# Patient Record
Sex: MALE | Race: BLACK OR AFRICAN AMERICAN | NOT HISPANIC OR LATINO | Employment: UNEMPLOYED | URBAN - METROPOLITAN AREA
[De-identification: names, ages, dates, MRNs, and addresses within clinical notes are randomized per-mention and may not be internally consistent; named-entity substitution may affect disease eponyms.]

---

## 2024-05-22 ENCOUNTER — OFFICE VISIT (OUTPATIENT)
Dept: FAMILY MEDICINE | Facility: CLINIC | Age: 39
End: 2024-05-22
Payer: COMMERCIAL

## 2024-05-22 VITALS
DIASTOLIC BLOOD PRESSURE: 95 MMHG | BODY MASS INDEX: 36.45 KG/M2 | SYSTOLIC BLOOD PRESSURE: 138 MMHG | OXYGEN SATURATION: 97 % | RESPIRATION RATE: 22 BRPM | HEIGHT: 78 IN | TEMPERATURE: 97 F | WEIGHT: 315 LBS | HEART RATE: 95 BPM

## 2024-05-22 DIAGNOSIS — Z13.220 SCREENING CHOLESTEROL LEVEL: ICD-10-CM

## 2024-05-22 DIAGNOSIS — J30.2 SEASONAL ALLERGIC RHINITIS, UNSPECIFIED TRIGGER: ICD-10-CM

## 2024-05-22 DIAGNOSIS — Z76.89 ENCOUNTER TO ESTABLISH CARE: Primary | ICD-10-CM

## 2024-05-22 DIAGNOSIS — R03.0 ELEVATED BP WITHOUT DIAGNOSIS OF HYPERTENSION: ICD-10-CM

## 2024-05-22 DIAGNOSIS — K43.9 ABDOMINAL WALL HERNIA: ICD-10-CM

## 2024-05-22 DIAGNOSIS — Z11.59 NEED FOR HEPATITIS C SCREENING TEST: ICD-10-CM

## 2024-05-22 DIAGNOSIS — Z11.4 SCREENING FOR HIV (HUMAN IMMUNODEFICIENCY VIRUS): ICD-10-CM

## 2024-05-22 PROCEDURE — 36415 COLL VENOUS BLD VENIPUNCTURE: CPT

## 2024-05-22 PROCEDURE — 86803 HEPATITIS C AB TEST: CPT

## 2024-05-22 PROCEDURE — 99203 OFFICE O/P NEW LOW 30 MIN: CPT | Mod: GC

## 2024-05-22 PROCEDURE — 87389 HIV-1 AG W/HIV-1&-2 AB AG IA: CPT

## 2024-05-22 PROCEDURE — 80048 BASIC METABOLIC PNL TOTAL CA: CPT

## 2024-05-22 PROCEDURE — 80061 LIPID PANEL: CPT

## 2024-05-22 RX ORDER — FLUTICASONE PROPIONATE 50 MCG
1 SPRAY, SUSPENSION (ML) NASAL DAILY
Qty: 9.9 ML | Refills: 0 | Status: SHIPPED | OUTPATIENT
Start: 2024-05-22 | End: 2024-08-05

## 2024-05-22 RX ORDER — LORATADINE 10 MG/1
10 TABLET ORAL DAILY
Qty: 90 TABLET | Refills: 0 | Status: SHIPPED | OUTPATIENT
Start: 2024-05-22 | End: 2024-08-20
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
## 2024-05-22 NOTE — LETTER
May 28, 2024      Venu TYSON Dacosta  [address]  [place] [address]        Dear ,    We are writing to inform you of your test results.    Recent labs show negative HIV and Hepatitis C screening. Also your electrolytes and kidney function are within normal limits.     Cholesterol levels show below goal HDL (good cholesterol) we usually like this to be above 40 and your level is 32. One way you can help bring this up is by increasing your physical activity.     Please let us know if you have any questions or concerns.       Resulted Orders   HIV Antigen Antibody Combo Cascade   Result Value Ref Range    HIV Antigen Antibody Combo Nonreactive Nonreactive      Comment:      Negative HIV-1 p24 antigen and HIV-1/2 antibody screening test results usually indicate the absence of HIV-1 and HIV-2 infection. However, such negative results do not rule-out acute HIV infection.  If acute HIV-1 or HIV-2 infection is suspected, detection of HIV-1 or HIV-2 RNA  is recommended.    Hepatitis C Screen Reflex to HCV RNA Quant and Genotype   Result Value Ref Range    Hepatitis C Antibody Nonreactive Nonreactive      Comment:      A nonreactive screening test result does not exclude the possibility of exposure to or infection with HCV. Nonreactive screening test results in individuals with prior exposure to HCV may be due to antibody levels below the limit of detection of this assay or lack of reactivity to the HCV antigens used in this assay. Patients with recent HCV infections (<3 months from time of exposure) may have false-negative HCV antibody results due to the time needed for seroconversion (average of 8 to 9 weeks).   Lipid panel   Result Value Ref Range    Cholesterol 165 <200 mg/dL    Triglycerides 120 <150 mg/dL    Direct Measure HDL 32 (L) >=40 mg/dL    LDL Cholesterol Calculated 109 (H) <=100 mg/dL    Non HDL Cholesterol 133 (H) <130 mg/dL    Patient Fasting > 8hrs? Unknown     Narrative     Cholesterol  Desirable:  <200 mg/dL    Triglycerides  Normal:  Less than 150 mg/dL  Borderline High:  150-199 mg/dL  High:  200-499 mg/dL  Very High:  Greater than or equal to 500 mg/dL    Direct Measure HDL  Female:  Greater than or equal to 50 mg/dL   Male:  Greater than or equal to 40 mg/dL    LDL Cholesterol  Desirable:  <100mg/dL  Above Desirable:  100-129 mg/dL   Borderline High:  130-159 mg/dL   High:  160-189 mg/dL   Very High:  >= 190 mg/dL    Non HDL Cholesterol  Desirable:  130 mg/dL  Above Desirable:  130-159 mg/dL  Borderline High:  160-189 mg/dL  High:  190-219 mg/dL  Very High:  Greater than or equal to 220 mg/dL   Basic metabolic panel   Result Value Ref Range    Sodium 139 135 - 145 mmol/L      Comment:      Reference intervals for this test were updated on 09/26/2023 to more accurately reflect our healthy population. There may be differences in the flagging of prior results with similar values performed with this method. Interpretation of those prior results can be made in the context of the updated reference intervals.     Potassium 3.8 3.4 - 5.3 mmol/L    Chloride 107 98 - 107 mmol/L    Carbon Dioxide (CO2) 22 22 - 29 mmol/L    Anion Gap 10 7 - 15 mmol/L    Urea Nitrogen 17.0 6.0 - 20.0 mg/dL    Creatinine 0.97 0.67 - 1.17 mg/dL    GFR Estimate >90 >60 mL/min/1.73m2    Calcium 9.5 8.6 - 10.0 mg/dL    Glucose 89 70 - 99 mg/dL    Patient Fasting > 8hrs? Unknown        If you have any questions or concerns, please call the clinic at the number listed above.       Sincerely,      Masha Washington MD

## 2024-05-23 LAB
ANION GAP SERPL CALCULATED.3IONS-SCNC: 10 MMOL/L (ref 7–15)
BUN SERPL-MCNC: 17 MG/DL (ref 6–20)
CALCIUM SERPL-MCNC: 9.5 MG/DL (ref 8.6–10)
CHLORIDE SERPL-SCNC: 107 MMOL/L (ref 98–107)
CHOLEST SERPL-MCNC: 165 MG/DL
CREAT SERPL-MCNC: 0.97 MG/DL (ref 0.67–1.17)
DEPRECATED HCO3 PLAS-SCNC: 22 MMOL/L (ref 22–29)
EGFRCR SERPLBLD CKD-EPI 2021: >90 ML/MIN/1.73M2
FASTING STATUS PATIENT QL REPORTED: ABNORMAL
FASTING STATUS PATIENT QL REPORTED: NORMAL
GLUCOSE SERPL-MCNC: 89 MG/DL (ref 70–99)
HCV AB SERPL QL IA: NONREACTIVE
HDLC SERPL-MCNC: 32 MG/DL
HIV 1+2 AB+HIV1 P24 AG SERPL QL IA: NONREACTIVE
LDLC SERPL CALC-MCNC: 109 MG/DL
NONHDLC SERPL-MCNC: 133 MG/DL
POTASSIUM SERPL-SCNC: 3.8 MMOL/L (ref 3.4–5.3)
SODIUM SERPL-SCNC: 139 MMOL/L (ref 135–145)
TRIGL SERPL-MCNC: 120 MG/DL

## 2024-06-03 ENCOUNTER — DOCUMENTATION ONLY (OUTPATIENT)
Dept: FAMILY MEDICINE | Facility: CLINIC | Age: 39
End: 2024-06-03

## 2024-06-03 NOTE — PROGRESS NOTES
Forms Request:  Today's Date: Nallely 3, 2024   Form Type:  Medical Opinion form  ,   Where is the form from: [facility]   How was form received: Patient walk in  CHRISTINE on file: NO   How is form being returned: Picked Up  Fax Number/Address: N/a   PCP: Giovanna Cortez Team: Yellow Team  Form Given to: Call center

## 2024-06-05 ENCOUNTER — OFFICE VISIT (OUTPATIENT)
Dept: FAMILY MEDICINE | Facility: CLINIC | Age: 39
End: 2024-06-05
Payer: COMMERCIAL

## 2024-06-05 VITALS
TEMPERATURE: 98.1 F | BODY MASS INDEX: 36.45 KG/M2 | SYSTOLIC BLOOD PRESSURE: 148 MMHG | WEIGHT: 315 LBS | HEIGHT: 78 IN | DIASTOLIC BLOOD PRESSURE: 104 MMHG | RESPIRATION RATE: 22 BRPM | HEART RATE: 113 BPM | OXYGEN SATURATION: 99 %

## 2024-06-05 DIAGNOSIS — L21.9 SEBORRHEIC DERMATITIS: ICD-10-CM

## 2024-06-05 DIAGNOSIS — I10 ESSENTIAL HYPERTENSION: Primary | ICD-10-CM

## 2024-06-05 PROCEDURE — 99214 OFFICE O/P EST MOD 30 MIN: CPT | Performed by: FAMILY MEDICINE

## 2024-06-05 PROCEDURE — G2211 COMPLEX E/M VISIT ADD ON: HCPCS | Performed by: FAMILY MEDICINE

## 2024-06-05 RX ORDER — HYDROCHLOROTHIAZIDE 12.5 MG/1
12.5 TABLET ORAL DAILY
Qty: 30 TABLET | Refills: 5 | Status: SHIPPED | OUTPATIENT
Start: 2024-06-05

## 2024-06-05 RX ORDER — KETOCONAZOLE 20 MG/ML
SHAMPOO TOPICAL
Qty: 120 ML | Refills: 5 | Status: SHIPPED | OUTPATIENT
Start: 2024-06-05

## 2024-06-05 NOTE — PATIENT INSTRUCTIONS
Take the new medication (hydrochlorothiazide) once a day for your blood pressure  Try to increase your walking to 30-60 minutes every day  Use the shampoo for your scalp twice a week for the next few weeks. Apply 5 to 10 mL to wet scalp, lather, leave on 3 to 5 minutes, and rinse.  Let's see you again in another month to recheck your blood sugar and some more lab tests
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
## 2024-06-26 ENCOUNTER — OFFICE VISIT (OUTPATIENT)
Dept: SURGERY | Facility: CLINIC | Age: 39
End: 2024-06-26
Payer: COMMERCIAL

## 2024-06-26 ENCOUNTER — HOSPITAL ENCOUNTER (INPATIENT)
Facility: CLINIC | Age: 39
Setting detail: SURGERY ADMIT
End: 2024-06-26
Attending: SURGERY | Admitting: SURGERY
Payer: COMMERCIAL

## 2024-06-26 VITALS — WEIGHT: 315 LBS | BODY MASS INDEX: 36.45 KG/M2 | HEIGHT: 78 IN

## 2024-06-26 DIAGNOSIS — K43.2 INCISIONAL HERNIA, WITHOUT OBSTRUCTION OR GANGRENE: Primary | ICD-10-CM

## 2024-06-26 DIAGNOSIS — K43.9 ABDOMINAL WALL HERNIA: ICD-10-CM

## 2024-06-26 PROCEDURE — 99204 OFFICE O/P NEW MOD 45 MIN: CPT | Performed by: SURGERY

## 2024-06-26 RX ORDER — CEFAZOLIN SODIUM/WATER 3 G/30 ML
3 SYRINGE (ML) INTRAVENOUS
Status: CANCELLED | OUTPATIENT
Start: 2024-09-17

## 2024-06-26 RX ORDER — CEFAZOLIN SODIUM/WATER 3 G/30 ML
3 SYRINGE (ML) INTRAVENOUS SEE ADMIN INSTRUCTIONS
Status: CANCELLED | OUTPATIENT
Start: 2024-09-17
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
## 2024-08-05 DIAGNOSIS — J30.2 SEASONAL ALLERGIC RHINITIS, UNSPECIFIED TRIGGER: ICD-10-CM

## 2024-08-05 RX ORDER — FLUTICASONE PROPIONATE 50 MCG
1 SPRAY, SUSPENSION (ML) NASAL DAILY
Qty: 16 G | Refills: 0 | Status: SHIPPED | OUTPATIENT
Start: 2024-08-05

## 2024-09-05 ENCOUNTER — OFFICE VISIT (OUTPATIENT)
Dept: FAMILY MEDICINE | Facility: CLINIC | Age: 39
End: 2024-09-05
Payer: COMMERCIAL

## 2024-09-05 VITALS
DIASTOLIC BLOOD PRESSURE: 105 MMHG | HEIGHT: 78 IN | BODY MASS INDEX: 36.45 KG/M2 | HEART RATE: 107 BPM | WEIGHT: 315 LBS | TEMPERATURE: 98.6 F | OXYGEN SATURATION: 98 % | SYSTOLIC BLOOD PRESSURE: 161 MMHG

## 2024-09-05 DIAGNOSIS — K43.9 VENTRAL HERNIA WITHOUT OBSTRUCTION OR GANGRENE: ICD-10-CM

## 2024-09-05 DIAGNOSIS — Z01.818 PREOP GENERAL PHYSICAL EXAM: Primary | ICD-10-CM

## 2024-09-05 DIAGNOSIS — L73.9 FOLLICULITIS: ICD-10-CM

## 2024-09-05 DIAGNOSIS — I10 ESSENTIAL HYPERTENSION: ICD-10-CM

## 2024-09-05 PROCEDURE — 99214 OFFICE O/P EST MOD 30 MIN: CPT | Mod: 25

## 2024-09-05 PROCEDURE — 90471 IMMUNIZATION ADMIN: CPT

## 2024-09-05 PROCEDURE — 80048 BASIC METABOLIC PNL TOTAL CA: CPT

## 2024-09-05 PROCEDURE — 36415 COLL VENOUS BLD VENIPUNCTURE: CPT

## 2024-09-05 PROCEDURE — 90656 IIV3 VACC NO PRSV 0.5 ML IM: CPT

## 2024-09-05 RX ORDER — CLINDAMYCIN HCL 300 MG
300 CAPSULE ORAL 4 TIMES DAILY
Qty: 20 CAPSULE | Refills: 0 | Status: SHIPPED | OUTPATIENT
Start: 2024-09-05 | End: 2024-09-10

## 2024-09-05 RX ORDER — LOSARTAN POTASSIUM 25 MG/1
25 TABLET ORAL DAILY
Qty: 90 TABLET | Refills: 1 | Status: SHIPPED | OUTPATIENT
Start: 2024-09-05

## 2024-09-05 NOTE — PROGRESS NOTES
Preoperative Evaluation  [facility] [address]  [place] [address]  Phone: [phone]  Fax: [phone]  Primary Provider: Giovanna Montano MD  Pre-op Performing Provider: Giovanna Montano MD  Sep 5, 2024   {Provider  Link to PREOP SmartSet  REQUIRED to apply standard patient instructions and medication directions to the AVS :332478}  {ROOMER review and update patient entered surgical information if needed :927904}        9/5/2024   Surgical Information   What procedure is being done? herina   Facility or Hospital where procedure/surgery will be performed: [facility]   Who is doing the procedure / surgery? dnt remember   Date of surgery / procedure: [id number]   Time of surgery / procedure: 6am   Where do you plan to recover after surgery? Other - ***        Fax number for surgical facility: {:[phone]}    {Provider Charting Preference for Preop :584305}    Dl Lea is a 39 year old, presenting for the following:  Pre-Op Exam (Pre-op 9/19/24. Concern about high blood pressure. Need Flonase (nasal spray).)  Hypertension. Hasn't taken hydrochlorothiazide for the past week or so. He was concerned that the medication was elevating his BP. Donates plasma twice a week. Monday his blood pressure 130s/80s. The longest period he took it consistently 160-170      History meth use 2006 -2007 last use.     No chest pain, sob, or headaches. Never smoker, marijuana occasional use.         9/5/2024     2:20 PM   Additional Questions   Roomed by Sharda   Accompanied by Self         9/5/2024    Information    services provided? No        HPI related to upcoming procedure: ***        9/5/2024   Pre-Op Questionnaire   Have you ever had a heart attack or stroke? No   Have you ever had surgery on your heart or blood vessels, such as a stent placement, a coronary artery bypass, or surgery on an artery in your head, neck, heart, or legs? No   Do you have chest pain with  activity? No   Do you have a history of heart failure? No   Do you currently have a cold, bronchitis or symptoms of other infection? No   Do you have a cough, shortness of breath, or wheezing? No   Do you or anyone in your family have previous history of blood clots? No   Do you or does anyone in your family have a serious bleeding problem such as prolonged bleeding following surgeries or cuts? No   Have you ever had problems with anemia or been told to take iron pills? No   Have you had any abnormal blood loss such as black, tarry or bloody stools? No   Have you ever had a blood transfusion? No   Are you willing to have a blood transfusion if it is medically needed before, during, or after your surgery? Yes   Have you or any of your relatives ever had problems with anesthesia? No   Do you have sleep apnea, excessive snoring or daytime drowsiness? No   Do you have any artifical heart valves or other implanted medical devices like a pacemaker, defibrillator, or continuous glucose monitor? No   Do you have artificial joints? No   Are you allergic to latex? No        Health Care Directive  Patient does not have a Health Care Directive or Living Will: {ADVANCE_DIRECTIVE_STATUS:611989}    Preoperative Review of   {Mnpmpreport:157729}  {Review MNPMP for all patients per [facility] MNPMP Profile:590947}    {Chronic problem details (Optional) :935885}    There are no problems to display for this patient.     No past medical history on file.  Past Surgical History:   Procedure Laterality Date    FACIAL FRACTURE SURGERY       Current Outpatient Medications   Medication Sig Dispense Refill    fluticasone (FLONASE) 50 MCG/ACT nasal spray Spray 1 spray into both nostrils daily 16 g 0    hydroCHLOROthiazide 12.5 MG tablet Take 1 tablet (12.5 mg) by mouth daily 30 tablet 5    ketoconazole (NIZORAL) 2 % external shampoo Apply 5 to 10 mL to wet scalp, lather, leave on 3 to 5 minutes, and rinse; apply twice weekly for 2 to 4 weeks 120  "mL 5       Allergies   Allergen Reactions    No Known Allergies         Social History     Tobacco Use    Smoking status: Never     Passive exposure: Never    Smokeless tobacco: Never   Substance Use Topics    Alcohol use: Not on file     No family history on file.  History   Drug Use Not on file           {ROS Picklists (Optional):179169}    Objective    BP (!) 161/105   Pulse 107   Temp 98.6  F (37  C) (Oral)   Ht 1.981 m (6' 6\")   Wt 146.5 kg (323 lb)   SpO2 98%   BMI 37.33 kg/m     Estimated body mass index is 37.33 kg/m  as calculated from the following:    Height as of this encounter: 1.981 m (6' 6\").    Weight as of this encounter: 146.5 kg (323 lb).  Physical Exam  {Exam List :199053}    Recent Labs   Lab Test 24  1420      POTASSIUM 3.8   CR 0.97        Diagnostics  {LABS:943331}   {EK}    Revised Cardiac Risk Index (RCRI)  The patient has the following serious cardiovascular risks for perioperative complications:  {PREOP REVISED CARDIAC RISK INDEX (RCRI) :596004}     RCRI Interpretation: {REVISED CARDIAC RISK INTERPRETATION :672838}         Signed Electronically by: Giovanna Montano MD  A copy of this evaluation report is provided to the requesting physician.    {Provider Resources  Preop [facility] Preop Guidelines  Revised Cardiac Risk Index :202382}   {Email feedback regarding this note to [email or web address]   :063186}  "

## 2024-09-05 NOTE — PATIENT INSTRUCTIONS

## 2024-09-06 LAB
ANION GAP SERPL CALCULATED.3IONS-SCNC: 10 MMOL/L (ref 7–15)
BUN SERPL-MCNC: 11.4 MG/DL (ref 6–20)
CALCIUM SERPL-MCNC: 9.8 MG/DL (ref 8.8–10.4)
CHLORIDE SERPL-SCNC: 108 MMOL/L (ref 98–107)
CREAT SERPL-MCNC: 1.1 MG/DL (ref 0.67–1.17)
EGFRCR SERPLBLD CKD-EPI 2021: 89 ML/MIN/1.73M2
GLUCOSE SERPL-MCNC: 93 MG/DL (ref 70–99)
HCO3 SERPL-SCNC: 25 MMOL/L (ref 22–29)
POTASSIUM SERPL-SCNC: 3.9 MMOL/L (ref 3.4–5.3)
SODIUM SERPL-SCNC: 143 MMOL/L (ref 135–145)

## 2024-09-16 ENCOUNTER — TELEPHONE (OUTPATIENT)
Dept: SURGERY | Facility: CLINIC | Age: 39
End: 2024-09-16
Payer: COMMERCIAL

## 2024-09-16 RX ORDER — SODIUM CHLORIDE, SODIUM LACTATE, POTASSIUM CHLORIDE, CALCIUM CHLORIDE 600; 310; 30; 20 MG/100ML; MG/100ML; MG/100ML; MG/100ML
INJECTION, SOLUTION INTRAVENOUS CONTINUOUS
Status: CANCELLED | OUTPATIENT
Start: 2024-09-16

## 2024-09-16 RX ORDER — LIDOCAINE 40 MG/G
CREAM TOPICAL
Status: CANCELLED | OUTPATIENT
Start: 2024-09-16

## 2024-09-16 NOTE — TELEPHONE ENCOUNTER
Left  second message for patient regarding the request to have a repeat BP check by the PCP per their direction at the Pre Op Physical.   Without this completed they are not apparently cleared for surgery and we will need to cancel surgery with Dr. Cardenas tomorrow. Requested that patient return my call by 1500 today - at that point I will need to cancel the surgery if we have not received a clearance.

## 2024-09-16 NOTE — TELEPHONE ENCOUNTER
Received message today from Pre Op Team @  stating the patient was NOT cleared for surgery by PCP on 9/5 due to high BP. Pt was told to return for a recheck in 1 wk, no records show this recheck has been completed. Surgery scheduled tomorrow 9/17.    Left a message for Venu that we need him call us back right away and to get in to the PCP office TODAY for the BP Recheck - H&P Clearance for surgery is pending this check.    Notifying surgeon.  Pre Op Team notified a VM was left for the patient.

## 2024-09-16 NOTE — TELEPHONE ENCOUNTER
Patient has not returned calls, he did not follow up with his PCP direction on BP check.  Spoke with Team at [facility], they confirmed the patient no showed a follow up appointment on 9/12, has not called since.  Procedure has been canceled at this time.  WW notified of cancellation.   Provider notified of cancellation  Post Op appointments have been canceled

## 2025-01-09 ENCOUNTER — OFFICE VISIT (OUTPATIENT)
Dept: FAMILY MEDICINE | Facility: CLINIC | Age: 40
End: 2025-01-09
Payer: COMMERCIAL

## 2025-01-09 VITALS
RESPIRATION RATE: 20 BRPM | DIASTOLIC BLOOD PRESSURE: 68 MMHG | SYSTOLIC BLOOD PRESSURE: 138 MMHG | WEIGHT: 303 LBS | OXYGEN SATURATION: 98 % | BODY MASS INDEX: 35.06 KG/M2 | HEIGHT: 78 IN | TEMPERATURE: 98.3 F | HEART RATE: 113 BPM

## 2025-01-09 DIAGNOSIS — D62 ANEMIA DUE TO BLOOD LOSS, ACUTE: ICD-10-CM

## 2025-01-09 DIAGNOSIS — G89.18 POSTOPERATIVE PAIN: ICD-10-CM

## 2025-01-09 DIAGNOSIS — N17.9 AKI (ACUTE KIDNEY INJURY): ICD-10-CM

## 2025-01-09 DIAGNOSIS — Z09 HOSPITAL DISCHARGE FOLLOW-UP: Primary | ICD-10-CM

## 2025-01-09 LAB
ERYTHROCYTE [DISTWIDTH] IN BLOOD BY AUTOMATED COUNT: 13 % (ref 10–15)
HCT VFR BLD AUTO: 33 % (ref 40–53)
HGB BLD-MCNC: 10.5 G/DL (ref 13.3–17.7)
MCH RBC QN AUTO: 27.5 PG (ref 26.5–33)
MCHC RBC AUTO-ENTMCNC: 31.8 G/DL (ref 31.5–36.5)
MCV RBC AUTO: 86 FL (ref 78–100)
PLATELET # BLD AUTO: 453 10E3/UL (ref 150–450)
RBC # BLD AUTO: 3.82 10E6/UL (ref 4.4–5.9)
WBC # BLD AUTO: 6.7 10E3/UL (ref 4–11)

## 2025-01-09 PROCEDURE — 85027 COMPLETE CBC AUTOMATED: CPT

## 2025-01-09 PROCEDURE — 36415 COLL VENOUS BLD VENIPUNCTURE: CPT

## 2025-01-09 PROCEDURE — 99214 OFFICE O/P EST MOD 30 MIN: CPT | Mod: GC

## 2025-01-09 PROCEDURE — 80048 BASIC METABOLIC PNL TOTAL CA: CPT

## 2025-01-09 RX ORDER — ACETAMINOPHEN 500 MG
500-1000 TABLET ORAL EVERY 6 HOURS PRN
Qty: 90 TABLET | Refills: 2 | Status: SHIPPED | OUTPATIENT
Start: 2025-01-09

## 2025-01-09 RX ORDER — ACETAMINOPHEN 500 MG
500-1000 TABLET ORAL EVERY 6 HOURS PRN
Qty: 100 TABLET | Refills: 3 | Status: SHIPPED | OUTPATIENT
Start: 2025-01-09

## 2025-01-09 RX ORDER — OXYCODONE HYDROCHLORIDE 5 MG/1
5 TABLET ORAL 2 TIMES DAILY PRN
Qty: 14 TABLET | Refills: 0 | Status: SHIPPED | OUTPATIENT
Start: 2025-01-09

## 2025-01-09 ASSESSMENT — PATIENT HEALTH QUESTIONNAIRE - PHQ9
SUM OF ALL RESPONSES TO PHQ QUESTIONS 1-9: 1
10. IF YOU CHECKED OFF ANY PROBLEMS, HOW DIFFICULT HAVE THESE PROBLEMS MADE IT FOR YOU TO DO YOUR WORK, TAKE CARE OF THINGS AT HOME, OR GET ALONG WITH OTHER PEOPLE: SOMEWHAT DIFFICULT
SUM OF ALL RESPONSES TO PHQ QUESTIONS 1-9: 1

## 2025-01-09 NOTE — PROGRESS NOTES
{PROVIDER CHARTING PREFERENCE:225837}    Dl Lea is a 39 year old, presenting for the following health issues:  Hospital F/U      1/9/2025    10:29 AM   Additional Questions   Roomed by Yara   Accompanied by SENAIT         1/9/2025    Information    services provided? No     HPI   Venu is 38 yo male with history of HTN who presents for hospital follow up. He had Type A aortic dissection s/p repair of ascendign aorta with residual type B dissection. Since discharge from hospital he has been slowly improving but struggles with pain 8/10 in his rib cage/ surgical scar and back with movement, coughing or pending down. No chest pain with exertion or shortness of breath.  No nausea vomiting able to eat well.  He notes loose to watery stools but has been taking stool softeners every day since discharge from hospital. States he ran out of oxycodone and tylenol few days ago and has not been able to manage pain. Reports BP well controlled within goal. Has BP cuff and has been checking BP daily. Within 140s in the afternoon after taking his morning meds.     Hospital Follow-up Visit:    Hospital/Nursing Home/IP Rehab Facility:  [facility]  Date of Admission: 12/22  Date of Discharge: 12/29  Reason(s) for Admission: Aortic dissection  Was the patient in the ICU or did the patient experience delirium during hospitalization?  No  Do you have any other stressors you would like to discuss with your provider? No    Problems taking medications regularly:  None  Medication changes since discharge: None  Problems adhering to non-medication therapy: reports compliance but not sure of different medication names and indications. Will have close follow up within one week for medication review.    Summary of hospitalization:  CareEverywhere information obtained and reviewed  Diagnostic Tests/Treatments reviewed.  Follow up needed: CBC and BMP  Other Healthcare Providers Involved in Patient s  "Care:          CV surgery and  Cardiology.  Update since discharge: stable. {TIP  Include information from family/caregivers, SNF, Care Coordination :407266}      Wt Readings from Last 5 Encounters:   01/09/25 137.4 kg (303 lb)   09/05/24 146.5 kg (323 lb)   06/26/24 (!) 150.1 kg (331 lb)   06/05/24 (!) 153.3 kg (338 lb)   05/22/24 (!) 154.7 kg (341 lb)       Plan of care communicated with patient     {Reference  Coding guidelines- Moderate Complexity F2F/Video within 7 - 14 days of discharge 0480668, High Complexity F2F/Video within 7 days 2041329 or [id number] days 1785649 :178056}        Review of Systems  Constitutional, HEENT, cardiovascular, pulmonary, gi and gu systems are negative, except as otherwise noted.      Objective    /68   Pulse 113   Temp 98.3  F (36.8  C)   Resp 20   Ht 1.981 m (6' 6\")   Wt 137.4 kg (303 lb)   SpO2 98%   BMI 35.02 kg/m    Body mass index is 35.02 kg/m .  Physical Exam   {Brief/partially selected :794143}    {Diagnostic Test Results (Optional):132246}        Signed Electronically by: Giovanna Montano MD  {Email feedback regarding this note to [email or web address]   :484895}  Answers submitted by the patient for this visit:  Patient Health Questionnaire (Submitted on 1/9/2025)  If you checked off any problems, how difficult have these problems made it for you to do your work, take care of things at home, or get along with other people?: Somewhat difficult  PHQ9 TOTAL SCORE: 1    " "discharge: stable.       Wt Readings from Last 5 Encounters:   01/09/25 137.4 kg (303 lb)   09/05/24 146.5 kg (323 lb)   06/26/24 (!) 150.1 kg (331 lb)   06/05/24 (!) 153.3 kg (338 lb)   05/22/24 (!) 154.7 kg (341 lb)       Plan of care communicated with patient             Review of Systems  Constitutional, HEENT, cardiovascular, pulmonary, gi and gu systems are negative, except as otherwise noted.      Objective    /68   Pulse 113   Temp 98.3  F (36.8  C)   Resp 20   Ht 1.981 m (6' 6\")   Wt 137.4 kg (303 lb)   SpO2 98%   BMI 35.02 kg/m    Body mass index is 35.02 kg/m .  Physical Exam   GENERAL: alert and no distress  RESP: lungs clear to auscultation - no rales, rhonchi or wheezes  CV: sternotomy scare appears well healed, no drainage. regular rate and rhythm, normal S1 S2, no murmur, no peripheral edema  ABDOMEN: soft, nontender, no hepatosplenomegaly, no masses and bowel sounds normal  MS: no gross musculoskeletal defects noted, no edema        Signed Electronically by: MAGALIE Dave PGY3    Answers submitted by the patient for this visit:  Patient Health Questionnaire (Submitted on 1/9/2025)  If you checked off any problems, how difficult have these problems made it for you to do your work, take care of things at home, or get along with other people?: Somewhat difficult  PHQ9 TOTAL SCORE: 1    "

## 2025-01-09 NOTE — PROGRESS NOTES
Preceptor Attestation:   Patient seen, evaluated and discussed with the resident. I have verified the content of the note, which accurately reflects my assessment of the patient and the plan of care.    Supervising Physician:Lidia Stallworth MD    [facility]

## 2025-01-10 LAB
ANION GAP SERPL CALCULATED.3IONS-SCNC: 12 MMOL/L (ref 7–15)
BUN SERPL-MCNC: 18.9 MG/DL (ref 6–20)
CALCIUM SERPL-MCNC: 9.7 MG/DL (ref 8.8–10.4)
CHLORIDE SERPL-SCNC: 106 MMOL/L (ref 98–107)
CREAT SERPL-MCNC: 1.01 MG/DL (ref 0.67–1.17)
EGFRCR SERPLBLD CKD-EPI 2021: >90 ML/MIN/1.73M2
GLUCOSE SERPL-MCNC: 86 MG/DL (ref 70–99)
HCO3 SERPL-SCNC: 22 MMOL/L (ref 22–29)
POTASSIUM SERPL-SCNC: 4.2 MMOL/L (ref 3.4–5.3)
SODIUM SERPL-SCNC: 140 MMOL/L (ref 135–145)

## 2025-01-22 ENCOUNTER — OFFICE VISIT (OUTPATIENT)
Dept: CARDIOLOGY | Facility: CLINIC | Age: 40
End: 2025-01-22
Payer: COMMERCIAL

## 2025-01-22 VITALS
RESPIRATION RATE: 20 BRPM | HEIGHT: 78 IN | BODY MASS INDEX: 34.62 KG/M2 | WEIGHT: 299.2 LBS | OXYGEN SATURATION: 95 % | DIASTOLIC BLOOD PRESSURE: 76 MMHG | HEART RATE: 120 BPM | SYSTOLIC BLOOD PRESSURE: 138 MMHG

## 2025-01-22 DIAGNOSIS — Z98.890 HX OF REPAIR OF DISSECTING THORACIC AORTIC ANEURYSM, STANFORD TYPE A: ICD-10-CM

## 2025-01-22 DIAGNOSIS — R07.89 CHEST WALL PAIN: ICD-10-CM

## 2025-01-22 DIAGNOSIS — R00.0 TACHYCARDIA: Primary | ICD-10-CM

## 2025-01-22 DIAGNOSIS — Z86.79 HX OF REPAIR OF DISSECTING THORACIC AORTIC ANEURYSM, STANFORD TYPE A: ICD-10-CM

## 2025-01-22 DIAGNOSIS — I10 ESSENTIAL HYPERTENSION: ICD-10-CM

## 2025-01-22 LAB
ATRIAL RATE - MUSE: 116 BPM
DIASTOLIC BLOOD PRESSURE - MUSE: NORMAL MMHG
INTERPRETATION ECG - MUSE: NORMAL
P AXIS - MUSE: 72 DEGREES
PR INTERVAL - MUSE: 162 MS
QRS DURATION - MUSE: 96 MS
QT - MUSE: 336 MS
QTC - MUSE: 467 MS
R AXIS - MUSE: 62 DEGREES
SYSTOLIC BLOOD PRESSURE - MUSE: NORMAL MMHG
T AXIS - MUSE: 240 DEGREES
VENTRICULAR RATE- MUSE: 116 BPM

## 2025-01-22 PROCEDURE — 99204 OFFICE O/P NEW MOD 45 MIN: CPT | Performed by: INTERNAL MEDICINE

## 2025-01-22 PROCEDURE — G2211 COMPLEX E/M VISIT ADD ON: HCPCS | Performed by: INTERNAL MEDICINE

## 2025-01-22 RX ORDER — FUROSEMIDE 20 MG/1
20 TABLET ORAL DAILY
COMMUNITY

## 2025-01-22 RX ORDER — METOPROLOL TARTRATE 50 MG
75 TABLET ORAL 2 TIMES DAILY
COMMUNITY
End: 2025-01-22

## 2025-01-22 RX ORDER — CYCLOBENZAPRINE HCL 5 MG
10 TABLET ORAL 3 TIMES DAILY PRN
COMMUNITY

## 2025-01-22 RX ORDER — METOPROLOL TARTRATE 50 MG
100 TABLET ORAL 2 TIMES DAILY
Qty: 240 TABLET | Refills: 3 | Status: SHIPPED | OUTPATIENT
Start: 2025-01-22

## 2025-01-22 NOTE — PROGRESS NOTES
[facility]  [phone]          Assessment/Recommendations   Patient with known hypertension presented to [facility] with left leg numbness and found to have type a aortic dissection.  He underwent emergency repair.  Transesophageal echocardiogram at the end of the case suggested left ventricular ejection fraction of 40 to 45%.  No bypass surgery was performed.  Preop CT showed no coronary calcifications.    The patient is main issue now is discomfort in his chest which is worse with coughing, sneezing, mood motion and if he rest comfortably does not have any this discomfort.  Breathing is a little bit short but not dramatically so.  He has not been very active.  They gave him some exercises to do but he is not enrolled in rehab and his insurance does not cover him at [facility].    Blood pressure reasonable today but would like his blood pressure in the 1 10-1 20 range.  Will increase metoprolol to 100 mg twice daily.  Recent electrolytes were unremarkable.    Tachycardic today and will check twelve-lead ECG and also get an echocardiogram more urgently to exclude the possibility of pericardial effusion.  He does not have evidence of elevated jugular venous pressure which is comforting.  Echo will also show us his left ventricular systolic function as a resting echo was not obtained at the time of his hospitalization that I can find.    ECG today shows sinus tachycardia 116 bpm and inferior lateral T wave changes which were described on EKG from December 23 at [facility].    Suspect tachycardia is related to pain relief.  I have asked him to call his primary doctor to see if there are other pain medicines other than Tylenol and ibuprofen that they could use in short supply to get him through this time frame.  If they are unwilling, the cardiac surgical group may be willing to offer some further medications as well.    We will get him set up for cardiac rehab at [facility]  "Hospital.    Will increase metoprolol to 100 mg twice daily.    I would like to see him in follow-up in 1 month given all of these issues.    The longitudinal plan of care for the diagnosis(es)/condition(s) as documented were addressed during this visit. Due to the added complexity in care, I will continue to support Venu in the subsequent management and with ongoing continuity of care.        History of Present Illness/Subjective    Mr. Venu Dacosta is a 39 year old male with recent hospitalization on December 22 for type a aortic dissection.  He had emergency repair.  He presented with numbness of his left leg.  The repair was unremarkable and he was discharged on December 29.  He has had some intermittent difficulties with pain in his chest related to surgery and is now out of oxycodone and the pain is quite dramatic.  He is using Tylenol which is of not helping the pain a great deal.  He tried ibuprofen and this did not seem to help at all.  He [facility] as he was told his insurance does not cover him there.  He is therefore here for follow-up care and ongoing care.    He denies orthopnea, paroxysmal nocturnal dyspnea, peripheral edema, and had 1 episode where he felt a little lightheaded but this was very brief in nature and he did not have syncope.  He has chest pain goes away if he is not moving and gets worse if he coughs, moves about, or sneezes.    Patient grew up in [place].  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  He is not currently .  He has 5 children.    ECG: Personally reviewed.  See above       Physical Examination Review of Systems   /76 (BP Location: Right arm, Patient Position: Sitting, Cuff Size: Adult Regular)   Pulse (!) 120   Resp 20   Ht 1.994 m (6' 6.5\")   Wt 135.7 kg (299 lb 3.2 oz)   SpO2 95%   BMI 34.14 kg/m    Body mass index is 34.14 kg/m .  Wt Readings from Last 3 Encounters:   01/22/25 " "135.7 kg (299 lb 3.2 oz)   01/09/25 137.4 kg (303 lb)   09/05/24 146.5 kg (323 lb)     General Appearance:   Alert, cooperative and in no acute distress.   ENT/Mouth: Pink/moist oral mucosa   EYES:  no scleral icterus, normal conjunctivae   Neck: JVP normal. No Hepatojugular reflux. Thyroid not visualized.   Chest/Lungs:   Lungs are clear to auscultation, equal chest wall expansion.   Cardiovascular:   S1, S2 without murmur ,clicks or rubs. Brachial, radial  pulses are intact and symetric.  Posterior tibial pulses are difficult to palpate.  No carotid bruits noted   Abdomen:  Nontender.    Extremities: No cyanosis, clubbing or edema   Skin: no xanthelasma, warm.    Neurologic: normal arm movement bilateral, no tremors     Psychiatric: Appropriate affect.      Encounter Vitals  BP: 138/76  Pulse: (!) 120  Resp: 20  SpO2: 95 %  Weight: 135.7 kg (299 lb 3.2 oz) (coat and shoes on)  Height: 199.4 cm (6' 6.5\") (shoes on)                                           Medical History  Surgical History Family History Social History   No past medical history on file. Past Surgical History:   Procedure Laterality Date    FACIAL FRACTURE SURGERY      No family history on file. Social History     Socioeconomic History    Marital status: Single     Spouse name: Not on file    Number of children: 0    Years of education: 12    Highest education level: High school graduate   Occupational History    Occupation: [employer]ONE MAY 21ST 2024   Tobacco Use    Smoking status: Never     Passive exposure: Never    Smokeless tobacco: Never   Vaping Use    Vaping status: Never Used   Substance and Sexual Activity    Alcohol use: Not on file    Drug use: Not on file    Sexual activity: Yes     Partners: Female   Other Topics Concern    Not on file   Social History Narrative    Not on file     Social Drivers of Health     Financial Resource Strain: Low Risk  (5/22/2024)    Financial Resource Strain     Within the past 12 " months, have you or your family members you live with been unable to get utilities (heat, electricity) when it was really needed?: No   Food Insecurity: Low Risk  (5/22/2024)    Food Insecurity     Within the past 12 months, did you worry that your food would run out before you got money to buy more?: No     Within the past 12 months, did the food you bought just not last and you didn t have money to get more?: No   Transportation Needs: Low Risk  (5/22/2024)    Transportation Needs     Within the past 12 months, has lack of transportation kept you from medical appointments, getting your medicines, non-medical meetings or appointments, work, or from getting things that you need?: No   Physical Activity: Not on file   Stress: Not on file   Social Connections: Not on file   Interpersonal Safety: Low Risk  (1/9/2025)    Interpersonal Safety     Do you feel physically and emotionally safe where you currently live?: Yes     Within the past 12 months, have you been hit, slapped, kicked or otherwise physically hurt by someone?: No     Within the past 12 months, have you been humiliated or emotionally abused in other ways by your partner or ex-partner?: No   Housing Stability: Low Risk  (5/22/2024)    Housing Stability     Do you have housing? : Yes     Are you worried about losing your housing?: No          Medications  Allergies   Current Outpatient Medications   Medication Sig Dispense Refill    acetaminophen (TYLENOL) 500 MG tablet Take 1-2 tablets (500-1,000 mg) by mouth every 6 hours as needed for mild pain. 100 tablet 3    cyclobenzaprine (FLEXERIL) 5 MG tablet Take 10 mg by mouth 3 times daily as needed for muscle spasms.      furosemide (LASIX) 20 MG tablet Take 20 mg by mouth daily.      ketoconazole (NIZORAL) 2 % external shampoo Apply 5 to 10 mL to wet scalp, lather, leave on 3 to 5 minutes, and rinse; apply twice weekly for 2 to 4 weeks 120 mL 5    metoprolol tartrate (LOPRESSOR) 50 MG tablet Take 75 mg by  "mouth 2 times daily.      Allergies   Allergen Reactions    No Known Allergies          Lab Results    Chemistry/lipid CBC Cardiac Enzymes/BNP/TSH/INR   Lab Results   Component Value Date    CHOL 165 05/22/2024    HDL 32 (L) 05/22/2024    TRIG 120 05/22/2024    BUN 18.9 01/09/2025     01/09/2025    CO2 22 01/09/2025    Lab Results   Component Value Date    WBC 6.7 01/09/2025    HGB 10.5 (L) 01/09/2025    HCT 33.0 (L) 01/09/2025    MCV 86 01/09/2025     (H) 01/09/2025    No results found for: \"CKTOTAL\", \"CKMB\", \"TROPONINI\", \"BNP\", \"TSH\", \"INR\"                                         "

## 2025-01-22 NOTE — LETTER
1/22/2025    Giovanna Montano MD  [address] [place] [address]    RE: Venu Dacosta       Dear Colleague,     I had the pleasure of seeing Venu Dacosta in the [facility].      [facility]  [phone]          Assessment/Recommendations   Patient with known hypertension presented to [facility] with left leg numbness and found to have type a aortic dissection.  He underwent emergency repair.  Transesophageal echocardiogram at the end of the case suggested left ventricular ejection fraction of 40 to 45%.  No bypass surgery was performed.  Preop CT showed no coronary calcifications.    The patient is main issue now is discomfort in his chest which is worse with coughing, sneezing, mood motion and if he rest comfortably does not have any this discomfort.  Breathing is a little bit short but not dramatically so.  He has not been very active.  They gave him some exercises to do but he is not enrolled in rehab and his insurance does not cover him at [facility].    Blood pressure reasonable today but would like his blood pressure in the 1 10-1 20 range.  Will increase metoprolol to 100 mg twice daily.  Recent electrolytes were unremarkable.    Tachycardic today and will check twelve-lead ECG and also get an echocardiogram more urgently to exclude the possibility of pericardial effusion.  He does not have evidence of elevated jugular venous pressure which is comforting.  Echo will also show us his left ventricular systolic function as a resting echo was not obtained at the time of his hospitalization that I can find.    ECG today shows sinus tachycardia 116 bpm and inferior lateral T wave changes which were described on EKG from December 23 at [facility].    Suspect tachycardia is related to pain relief.  I have asked him to call his primary doctor to see if there are other pain medicines other than Tylenol and ibuprofen that they could use in  short supply to get him through this time frame.  If they are unwilling, the cardiac surgical group may be willing to offer some further medications as well.    We will get him set up for cardiac rehab at [facility].    Will increase metoprolol to 100 mg twice daily.    I would like to see him in follow-up in 1 month given all of these issues.    The longitudinal plan of care for the diagnosis(es)/condition(s) as documented were addressed during this visit. Due to the added complexity in care, I will continue to support Venu in the subsequent management and with ongoing continuity of care.        History of Present Illness/Subjective    Mr. Venu Dacosta is a 39 year old male with recent hospitalization on December 22 for type a aortic dissection.  He had emergency repair.  He presented with numbness of his left leg.  The repair was unremarkable and he was discharged on December 29.  He has had some intermittent difficulties with pain in his chest related to surgery and is now out of oxycodone and the pain is quite dramatic.  He is using Tylenol which is of not helping the pain a great deal.  He tried ibuprofen and this did not seem to help at all.  He is unable to go for follow-up at cardiac surgery via [facility] as he was told his insurance does not cover him there.  He is therefore here for follow-up care and ongoing care.    He denies orthopnea, paroxysmal nocturnal dyspnea, peripheral edema, and had 1 episode where he felt a little lightheaded but this was very brief in nature and he did not have syncope.  He has chest pain goes away if he is not moving and gets worse if he coughs, moves about, or sneezes.    Patient grew up in [place].  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  He is not currently .  He has 5 children.    ECG: Personally reviewed.  See above       Physical Examination Review of Systems   /76 (BP Location: Right arm, Patient Position: Sitting, Cuff  "Size: Adult Regular)   Pulse (!) 120   Resp 20   Ht 1.994 m (6' 6.5\")   Wt 135.7 kg (299 lb 3.2 oz)   SpO2 95%   BMI 34.14 kg/m    Body mass index is 34.14 kg/m .  Wt Readings from Last 3 Encounters:   01/22/25 135.7 kg (299 lb 3.2 oz)   01/09/25 137.4 kg (303 lb)   09/05/24 146.5 kg (323 lb)     General Appearance:   Alert, cooperative and in no acute distress.   ENT/Mouth: Pink/moist oral mucosa   EYES:  no scleral icterus, normal conjunctivae   Neck: JVP normal. No Hepatojugular reflux. Thyroid not visualized.   Chest/Lungs:   Lungs are clear to auscultation, equal chest wall expansion.   Cardiovascular:   S1, S2 without murmur ,clicks or rubs. Brachial, radial  pulses are intact and symetric.  Posterior tibial pulses are difficult to palpate.  No carotid bruits noted   Abdomen:  Nontender.    Extremities: No cyanosis, clubbing or edema   Skin: no xanthelasma, warm.    Neurologic: normal arm movement bilateral, no tremors     Psychiatric: Appropriate affect.      Encounter Vitals  BP: 138/76  Pulse: (!) 120  Resp: 20  SpO2: 95 %  Weight: 135.7 kg (299 lb 3.2 oz) (coat and shoes on)  Height: 199.4 cm (6' 6.5\") (shoes on)                                           Medical History  Surgical History Family History Social History   No past medical history on file. Past Surgical History:   Procedure Laterality Date     FACIAL FRACTURE SURGERY      No family history on file. Social History     Socioeconomic History     Marital status: Single     Spouse name: Not on file     Number of children: 0     Years of education: 12     Highest education level: High school graduate   Occupational History     Occupation: [employer] DONE MAY 21ST 2024   Tobacco Use     Smoking status: Never     Passive exposure: Never     Smokeless tobacco: Never   Vaping Use     Vaping status: Never Used   Substance and Sexual Activity     Alcohol use: Not on file     Drug use: Not on file     Sexual activity: Yes     " Partners: Female   Other Topics Concern     Not on file   Social History Narrative     Not on file     Social Drivers of Health     Financial Resource Strain: Low Risk  (5/22/2024)    Financial Resource Strain      Within the past 12 months, have you or your family members you live with been unable to get utilities (heat, electricity) when it was really needed?: No   Food Insecurity: Low Risk  (5/22/2024)    Food Insecurity      Within the past 12 months, did you worry that your food would run out before you got money to buy more?: No      Within the past 12 months, did the food you bought just not last and you didn t have money to get more?: No   Transportation Needs: Low Risk  (5/22/2024)    Transportation Needs      Within the past 12 months, has lack of transportation kept you from medical appointments, getting your medicines, non-medical meetings or appointments, work, or from getting things that you need?: No   Physical Activity: Not on file   Stress: Not on file   Social Connections: Not on file   Interpersonal Safety: Low Risk  (1/9/2025)    Interpersonal Safety      Do you feel physically and emotionally safe where you currently live?: Yes      Within the past 12 months, have you been hit, slapped, kicked or otherwise physically hurt by someone?: No      Within the past 12 months, have you been humiliated or emotionally abused in other ways by your partner or ex-partner?: No   Housing Stability: Low Risk  (5/22/2024)    Housing Stability      Do you have housing? : Yes      Are you worried about losing your housing?: No          Medications  Allergies   Current Outpatient Medications   Medication Sig Dispense Refill     acetaminophen (TYLENOL) 500 MG tablet Take 1-2 tablets (500-1,000 mg) by mouth every 6 hours as needed for mild pain. 100 tablet 3     cyclobenzaprine (FLEXERIL) 5 MG tablet Take 10 mg by mouth 3 times daily as needed for muscle spasms.       furosemide (LASIX) 20 MG tablet Take 20 mg by  "mouth daily.       ketoconazole (NIZORAL) 2 % external shampoo Apply 5 to 10 mL to wet scalp, lather, leave on 3 to 5 minutes, and rinse; apply twice weekly for 2 to 4 weeks 120 mL 5     metoprolol tartrate (LOPRESSOR) 50 MG tablet Take 75 mg by mouth 2 times daily.      Allergies   Allergen Reactions     No Known Allergies          Lab Results    Chemistry/lipid CBC Cardiac Enzymes/BNP/TSH/INR   Lab Results   Component Value Date    CHOL 165 05/22/2024    HDL 32 (L) 05/22/2024    TRIG 120 05/22/2024    BUN 18.9 01/09/2025     01/09/2025    CO2 22 01/09/2025    Lab Results   Component Value Date    WBC 6.7 01/09/2025    HGB 10.5 (L) 01/09/2025    HCT 33.0 (L) 01/09/2025    MCV 86 01/09/2025     (H) 01/09/2025    No results found for: \"CKTOTAL\", \"CKMB\", \"TROPONINI\", \"BNP\", \"TSH\", \"INR\"                                             Thank you for allowing me to participate in the care of your patient.      Sincerely,     Festus Segura MD     [facility]  cc:   No referring provider defined for this encounter.      "

## 2025-01-23 DIAGNOSIS — R06.02 SOB (SHORTNESS OF BREATH): Primary | ICD-10-CM

## 2025-02-06 ENCOUNTER — TELEPHONE (OUTPATIENT)
Dept: FAMILY MEDICINE | Facility: CLINIC | Age: 40
End: 2025-02-06

## 2025-02-06 ENCOUNTER — OFFICE VISIT (OUTPATIENT)
Dept: FAMILY MEDICINE | Facility: CLINIC | Age: 40
End: 2025-02-06
Payer: COMMERCIAL

## 2025-02-06 VITALS
WEIGHT: 306 LBS | RESPIRATION RATE: 22 BRPM | HEIGHT: 78 IN | SYSTOLIC BLOOD PRESSURE: 141 MMHG | TEMPERATURE: 98 F | DIASTOLIC BLOOD PRESSURE: 73 MMHG | HEART RATE: 114 BPM | OXYGEN SATURATION: 100 % | BODY MASS INDEX: 35.4 KG/M2

## 2025-02-06 DIAGNOSIS — R07.89 CHEST WALL PAIN FOLLOWING SURGERY: ICD-10-CM

## 2025-02-06 DIAGNOSIS — G89.18 CHEST WALL PAIN FOLLOWING SURGERY: ICD-10-CM

## 2025-02-06 DIAGNOSIS — Z86.79 HX OF REPAIR OF DISSECTING THORACIC AORTIC ANEURYSM, STANFORD TYPE A: Primary | ICD-10-CM

## 2025-02-06 DIAGNOSIS — Z98.890 HX OF REPAIR OF DISSECTING THORACIC AORTIC ANEURYSM, STANFORD TYPE A: Primary | ICD-10-CM

## 2025-02-06 DIAGNOSIS — I10 ESSENTIAL HYPERTENSION: ICD-10-CM

## 2025-02-06 RX ORDER — OXYCODONE HYDROCHLORIDE 5 MG/1
5 TABLET ORAL 2 TIMES DAILY PRN
Qty: 20 TABLET | Refills: 0 | Status: SHIPPED | OUTPATIENT
Start: 2025-02-06 | End: 2025-02-20

## 2025-02-06 RX ORDER — ACETAMINOPHEN 500 MG
500-1000 TABLET ORAL EVERY 8 HOURS PRN
Qty: 90 TABLET | Refills: 0 | Status: SHIPPED | OUTPATIENT
Start: 2025-02-06

## 2025-02-06 NOTE — PROGRESS NOTES
"  {PROVIDER CHARTING PREFERENCE:411276}    Dl Lea is a 39 year old, presenting for the following health issues:  Follow up heart surgery  (Still pain on upper chest area. )      2/6/2025     3:00 PM   Additional Questions   Roomed by Sharda   Accompanied by Son         2/6/2025    Information    services provided? No     HPI     Chest wall pain with coughing and sneezing and pain is still intense   Has been off oxycodone since Sunday twice daily   That helped with pain, cardiologist   Tylenol, 3x daily  Volteran gel   Started cardiac rehab one week ago once a week. Goes to [place]  Found it difficult to do exercises pain at the surgical site               {MA/LPN/RN Pre-Provider Visit Orders- hCG/UA/Strep (Optional):719356}  {SUPERLIST (Optional):797867}  {additonal problems for provider to add (Optional):673822}    {ROS Picklists (Optional):688226}      Objective    BP (!) 141/73   Pulse 114   Temp 98  F (36.7  C) (Oral)   Resp 22   Ht 1.981 m (6' 6\")   Wt (!) 138.8 kg (306 lb)   SpO2 100%   BMI 35.36 kg/m    Body mass index is 35.36 kg/m .  Physical Exam   {Exam List (Optional):427338}    {Diagnostic Test Results (Optional):466090}        Signed Electronically by: Giovanna Montano MD  {Email feedback regarding this note to [email or web address]   :690006}  " "with coughing and sneezing and pain is still intense   Has been off oxycodone since Sunday  Reports taking it 2-3x daily for pain  Pain has been intense the past couple of days  Has been taking Tylenol, 3x daily  Started cardiac rehab one week ago once a week. Goes to [facility]  Reports pain in not exertional and no sob  Seen by cards few weeks ago, increased metoprolol to 100 BID  Patient reports compliance but didn't bring meds  Seems confused between when asked about current dose of metoprolol      Review of Systems  Constitutional, HEENT, cardiovascular, pulmonary, gi and gu systems are negative, except as otherwise noted.      Objective    BP (!) 141/73   Pulse 114   Temp 98  F (36.7  C) (Oral)   Resp 22   Ht 1.981 m (6' 6\")   Wt (!) 138.8 kg (306 lb)   SpO2 100%   BMI 35.36 kg/m    Body mass index is 35.36 kg/m .  Physical Exam   GENERAL: alert and no distress  RESP: lungs clear to auscultation - no rales, rhonchi or wheezes  CV: sternotomy scar appears well healed, normal S1 S2,  no murmur, click or rub, no peripheral edema  ABDOMEN: soft, nontender, ventral hernia, no masses and bowel sounds normal  MS: no gross musculoskeletal defects noted, no edema          Signed Electronically by: MAGALIE Dave    "

## 2025-02-06 NOTE — PROGRESS NOTES
Faculty Supervision of Residents   I have examined this patient and the medical care has been evaluated and discussed with the resident. See resident note outlining our discussion. Multiple issues. Uncertain medication compliance. Assess pain management and risks of opioids.  Ml Michelle MD

## 2025-02-06 NOTE — TELEPHONE ENCOUNTER
Forms Request:    Today's Date: February 6th, 2025     Form Type: Medical Opinion Form     Where is the form from: [place] Department of Human Services    How was form received: Patient in appointment    CHRISTINE on file: UNKNOWN     How is form being returned: Pt will pick it up     Fax Number/Address: N/A    PCP: Giovanna Montano MD     Color Team: Yellow Team    Form Given to: Giovanna Montano MD

## 2025-02-06 NOTE — LETTER
2/6/2025      Venu Dacosta  [address]  [place] [address]      Dear Colleague,    Thank you for referring your patient, Venu Dacosta, to the [facility]. Please see a copy of my visit note below.      Assessment & Plan    Hx of repair of dissecting thoracic aortic aneurysm, [place] type A  Chest wall pain following surgery  Patient reports pain has been intense has been trying to take tylenol 3x daily. Ran out of oxycodone 4 days ago.  Pain is worse with coughing or taking deep breaths and certain movements. Discussed to schedule tylenol 3x daily and use Voltaren gel on the area. Try to take oxycodone 1-2 daily. I expect as time goes by he would heal and not need oxycodone, discussed the addictive nature. If he continues to need it will get pain contract or send to pain clinic given hx of substance use.  - oxyCODONE (ROXICODONE) 5 MG tablet; Take 1 tablet (5 mg) by mouth 2 times daily as needed for pain  - diclofenac (VOLTAREN) 1 % topical gel; Apply 4 g topically 4 times daily.  - oxyCODONE (ROXICODONE) 5 MG tablet; Take 1 tablet (5 mg) by mouth 2 times daily as needed for pain.  - acetaminophen (TYLENOL) 500 MG tablet; Take 1-2 tablets (500-1,000 mg) by mouth every 8 hours as needed for mild pain.    Essential hypertension  Patient's blood pressure is significantly elevated. The goal is to maintain a systolic BP (SBP) between 110-120. There is uncertainty regarding medication compliance, particularly given the elevated heart rate despite current beta-blocker dose. Recent dose changes were explained, and the patient was advised to check BP daily. Close follow-up with all medications in one week to review together. ED precautions were also discussed  - follow up in one week    - bring all meds and blood pressure cuff        Subjective   Venu is a 39 year old, presenting for the following health issues:  Follow up heart surgery  (Still pain on upper chest  "area. )      2/6/2025     3:00 PM   Additional Questions   Roomed by Sharda   Accompanied by Son         2/6/2025    Information    services provided? No     HPI   S/p aortic dissection repair 12/29/24   C/o of ongoing Chest wall pain with coughing and sneezing and pain is still intense   Has been off oxycodone since Sunday  Reports taking it 2-3x daily for pain  Pain has been intense the past couple of days  Has been taking Tylenol, 3x daily  Started cardiac rehab one week ago once a week. Goes to [facility]  Reports pain in not exertional and no sob  Seen by cards few weeks ago, increased metoprolol to 100 BID  Patient reports compliance but didn't bring meds  Seems confused between when asked about current dose of metoprolol      Review of Systems  Constitutional, HEENT, cardiovascular, pulmonary, gi and gu systems are negative, except as otherwise noted.      Objective    BP (!) 141/73   Pulse 114   Temp 98  F (36.7  C) (Oral)   Resp 22   Ht 1.981 m (6' 6\")   Wt (!) 138.8 kg (306 lb)   SpO2 100%   BMI 35.36 kg/m    Body mass index is 35.36 kg/m .  Physical Exam   GENERAL: alert and no distress  RESP: lungs clear to auscultation - no rales, rhonchi or wheezes  CV: sternotomy scar appears well healed, normal S1 S2,  no murmur, click or rub, no peripheral edema  ABDOMEN: soft, nontender, ventral hernia, no masses and bowel sounds normal  MS: no gross musculoskeletal defects noted, no edema          Signed Electronically by: MAGALIE Dave      Faculty Supervision of Residents   I have examined this patient and the medical care has been evaluated and discussed with the resident. See resident note outlining our discussion. Multiple issues. Uncertain medication compliance. Assess pain management and risks of opioids.  Ml Michelle MD      Again, thank you for allowing me to participate in the care of your patient.        Sincerely,        Giovanna Montano MD    Electronically signed"

## 2025-02-10 ENCOUNTER — TELEPHONE (OUTPATIENT)
Dept: FAMILY MEDICINE | Facility: CLINIC | Age: 40
End: 2025-02-10
Payer: COMMERCIAL

## 2025-02-25 ENCOUNTER — OFFICE VISIT (OUTPATIENT)
Dept: SURGERY | Facility: CLINIC | Age: 40
End: 2025-02-25
Payer: COMMERCIAL

## 2025-02-25 ENCOUNTER — TELEPHONE (OUTPATIENT)
Dept: SURGERY | Facility: CLINIC | Age: 40
End: 2025-02-25

## 2025-02-25 VITALS
WEIGHT: 275 LBS | DIASTOLIC BLOOD PRESSURE: 78 MMHG | SYSTOLIC BLOOD PRESSURE: 142 MMHG | HEIGHT: 78 IN | BODY MASS INDEX: 31.82 KG/M2

## 2025-02-25 DIAGNOSIS — K43.2 VENTRAL INCISIONAL HERNIA: Primary | ICD-10-CM

## 2025-02-25 PROCEDURE — 99214 OFFICE O/P EST MOD 30 MIN: CPT | Performed by: SURGERY

## 2025-02-25 NOTE — PROGRESS NOTES
General Surgery H&P  Venu Dacosta MRN# [id number]   Age/Sex: 39 year old male YOB: 1985     Reason for visit: Incisional ventral hernia       Referring physician: Dr. Montano                    Assessment and Plan:   Assessment:  Incisional ventral hernia  Status post AAA repair  History of abdominal wall hernia repair    39-year-old male presenting with incisional ventral hernia of the mid abdomen that is very large but it is reducible.  Patient has no clinical signs of obstruction at this time.    Patient just recently underwent a very large AAA repair at [facility] in December 2024.  Given the recent surgery, recommendation is to hold off of surgery for at least 6 months to a year.  Patient will require medical clearance prior to surgery.    Discussed with the patient regarding his possible surgical intervention regarding the incisional ventral hernias.  The patient unfortunately has developed a recurrence from a hernia repair done in August 2020.  The patient had any emergent hernia with incarcerated preperitoneal fat.  The patient underwent a loop laparoscopic IPOM with placement of a 10 x 15 cm Ventralex mesh.  Given the presence of the Ventralex mesh and the recurrence of the hernia, I suspect that the patient could have mesh that is adherent tightly to abdominal contents.  If this were the case, I would have to remove this mesh before proceeding with surgery.  If if explantation was necessary of the mesh, the patient may require bowel resection possible lysis of adhesion.  Due to the large size of the abdominal wall hernia, the patient would also require one-sided if not two-sided transversus abdominis release.    Plan:  -Will have the patient scheduled for surgery in the summer 2025 robotically with mesh placement and possible mesh explantation, possible bowel resection, transversus abdominis release.    -Due to the large abdominal wall surgery, the patient would likely be  admitted after surgery    -Patient will require clearance from his primary team and if needed from his cardiac team before proceeding with surgery    -The risks and benefits of the procedure were explained detail to the patient. The risks include infection, bleeding, damage to the surrounding structures. Patient verbalized understanding provided consent to undergo the procedure above.            Chief Complaint:     Chief Complaint   Patient presents with    Consult For     Abdominal wall hernia, CT at [facility] 1/31/24,1/23/25 not work compimages and report in chart        History is obtained from the patient    HPI:   Venu Dacosta is a 39 year old male who presents to the general surgery team today for evaluation regarding large abdominal wall hernia.  The patient states that he has had this develop since 2020 when he had his emergency surgery done at [facility].  The patient ended up having an emergency laparoscopic IPOM surgery for an incarcerated hernia at that time.  Now the patient has recently had a AAA repair in December 2024.  Currently,, the patient has no clinical signs of obstruction.  He is able to tolerate p.o. diet.  Passing flatus having bowel moods.          Past Medical History:   History reviewed. No pertinent past medical history.           Past Surgical History:     Past Surgical History:   Procedure Laterality Date    FACIAL FRACTURE SURGERY               Social History:    reports that he has never smoked. He has never been exposed to tobacco smoke. He has never used smokeless tobacco.           Family History:   No family history on file.           Allergies:     Allergies   Allergen Reactions    No Known Allergies               Medications:     Prior to Admission medications    Medication Sig Start Date End Date Taking? Authorizing Provider   acetaminophen (TYLENOL) 500 MG tablet Take 1-2 tablets (500-1,000 mg) by mouth every 8 hours as needed for mild pain. 2/6/25  Yes Giovanna Montano MD  "  acetaminophen (TYLENOL) 500 MG tablet Take 1-2 tablets (500-1,000 mg) by mouth every 6 hours as needed for mild pain. 1/9/25  Yes Lidia Stallworth MD   cyclobenzaprine (FLEXERIL) 5 MG tablet Take 10 mg by mouth 3 times daily as needed for muscle spasms.   Yes Reported, Patient   diclofenac (VOLTAREN) 1 % topical gel Apply 4 g topically 4 times daily. 2/6/25  Yes Giovanna Montano MD   furosemide (LASIX) 20 MG tablet Take 20 mg by mouth daily.   Yes Reported, Patient   ketoconazole (NIZORAL) 2 % external shampoo Apply 5 to 10 mL to wet scalp, lather, leave on 3 to 5 minutes, and rinse; apply twice weekly for 2 to 4 weeks 6/5/24  Yes Lidia Stallworth MD   metoprolol tartrate (LOPRESSOR) 50 MG tablet Take 2 tablets (100 mg) by mouth 2 times daily. 1/22/25  Yes Festus Segura MD              Review of Systems:   A 12 point Review of Systems is negative other than noted in the HPI            Physical Exam:   Patient Vitals for the past 24 hrs:   BP Height Weight   02/25/25 1347 (!) 142/78 1.981 m (6' 6\") 124.7 kg (275 lb)        [unfilled]   Constitutional:   awake, alert, cooperative, no apparent distress, and appears stated age       Eyes:   PERRL, conjunctiva/corneas clear, EOM's intact; no scleral edema or icterus noted        ENT:   Normocephalic, without obvious abnormality, atraumatic, Lips, mucosa, and tongue normal        Hematologic / Lymphatic:   No lymphadenopathy       Lungs:   Normal respiratory effort, no accessory muscle use       Cardiovascular:   Regular rate and rhythm       Abdomen:   Soft, nondistended, nontender to palpation.  The incisional ventral hernia is reducible.       Musculoskeletal:   No obvious swelling, bruising or deformity       Skin:   Skin color and texture normal for patient, no rashes or lesions              Data:         All imaging studies reviewed by me. I reviewed the images, and I agree with a large incisional ventral hernia of the mid abdomen " containing small bowel.      DO David Jarrett DO  General Surgeon  [facility]  Surgery [facility] - [address] [address]?  Office: [phone]  Employed by [facility] [employer]  Pager: [phone]       I, Venu Dacosta, give verbal consent for a resident to be present in today's visit.     Constantino Mcintosh CMA  She/Her      [facility]  Surgery [facility] [place]  [facility] - [address] [address]    Office: [phone]  Fax: [phone]

## 2025-02-25 NOTE — TELEPHONE ENCOUNTER
Spoke with patient today to schedule surgery as ordered by the provider.    WC/MVA Related?: No    Went over details/instructions as written on the letter.    Pre Op By: H&P by Primary MD  Post Op Scheduled : YES    Medications:  Blood Thinners? No  Weight Loss Meds? No  Diabetes Meds? No    Surgery Letter sent via  given in clinic.      (Please see LETTERS TAB in chart to retrieve a copy of this letter)

## 2025-02-25 NOTE — LETTER
2/25/2025      Venu Dacosta  [address]  [place] [address]      Dear Colleague,    Thank you for referring your patient, Venu Dacosta, to the [facility] [place]. Please see a copy of my visit note below.    General Surgery H&P  Venu Dacosta MRN# [id number]   Age/Sex: 39 year old male YOB: 1985     Reason for visit: Incisional ventral hernia       Referring physician: Dr. Montano                    Assessment and Plan:   Assessment:  Incisional ventral hernia  Status post AAA repair  History of abdominal wall hernia repair    39-year-old male presenting with incisional ventral hernia of the mid abdomen that is very large but it is reducible.  Patient has no clinical signs of obstruction at this time.    Patient just recently underwent a very large AAA repair at [facility] in December 2024.  Given the recent surgery, recommendation is to hold off of surgery for at least 6 months to a year.  Patient will require medical clearance prior to surgery.    Discussed with the patient regarding his possible surgical intervention regarding the incisional ventral hernias.  The patient unfortunately has developed a recurrence from a hernia repair done in August 2020.  The patient had any emergent hernia with incarcerated preperitoneal fat.  The patient underwent a loop laparoscopic IPOM with placement of a 10 x 15 cm Ventralex mesh.  Given the presence of the Ventralex mesh and the recurrence of the hernia, I suspect that the patient could have mesh that is adherent tightly to abdominal contents.  If this were the case, I would have to remove this mesh before proceeding with surgery.  If if explantation was necessary of the mesh, the patient may require bowel resection possible lysis of adhesion.  Due to the large size of the abdominal wall hernia, the patient would also require one-sided if not two-sided transversus abdominis  release.    Plan:  -Will have the patient scheduled for surgery in the summer 2025 robotically with mesh placement and possible mesh explantation, possible bowel resection, transversus abdominis release.    -Due to the large abdominal wall surgery, the patient would likely be admitted after surgery    -Patient will require clearance from his primary team and if needed from his cardiac team before proceeding with surgery    -The risks and benefits of the procedure were explained detail to the patient. The risks include infection, bleeding, damage to the surrounding structures. Patient verbalized understanding provided consent to undergo the procedure above.            Chief Complaint:     Chief Complaint   Patient presents with     Consult For     Abdominal wall hernia, CT at [facility] 1/31/24,1/23/25 not work compimages and report in chart        History is obtained from the patient    HPI:   Venu Dacosta is a 39 year old male who presents to the general surgery team today for evaluation regarding large abdominal wall hernia.  The patient states that he has had this develop since 2020 when he had his emergency surgery done at [facility].  The patient ended up having an emergency laparoscopic IPOM surgery for an incarcerated hernia at that time.  Now the patient has recently had a AAA repair in December 2024.  Currently,, the patient has no clinical signs of obstruction.  He is able to tolerate p.o. diet.  Passing flatus having bowel moods.          Past Medical History:   History reviewed. No pertinent past medical history.           Past Surgical History:     Past Surgical History:   Procedure Laterality Date     FACIAL FRACTURE SURGERY               Social History:    reports that he has never smoked. He has never been exposed to tobacco smoke. He has never used smokeless tobacco.           Family History:   No family history on file.           Allergies:     Allergies   Allergen Reactions     No Known  "Allergies               Medications:     Prior to Admission medications    Medication Sig Start Date End Date Taking? Authorizing Provider   acetaminophen (TYLENOL) 500 MG tablet Take 1-2 tablets (500-1,000 mg) by mouth every 8 hours as needed for mild pain. 2/6/25  Yes Giovanna Montano MD   acetaminophen (TYLENOL) 500 MG tablet Take 1-2 tablets (500-1,000 mg) by mouth every 6 hours as needed for mild pain. 1/9/25  Yes Lidia Stallworth MD   cyclobenzaprine (FLEXERIL) 5 MG tablet Take 10 mg by mouth 3 times daily as needed for muscle spasms.   Yes Reported, Patient   diclofenac (VOLTAREN) 1 % topical gel Apply 4 g topically 4 times daily. 2/6/25  Yes Giovanna Montano MD   furosemide (LASIX) 20 MG tablet Take 20 mg by mouth daily.   Yes Reported, Patient   ketoconazole (NIZORAL) 2 % external shampoo Apply 5 to 10 mL to wet scalp, lather, leave on 3 to 5 minutes, and rinse; apply twice weekly for 2 to 4 weeks 6/5/24  Yes Lidia Stallworth MD   metoprolol tartrate (LOPRESSOR) 50 MG tablet Take 2 tablets (100 mg) by mouth 2 times daily. 1/22/25  Yes Festus Segura MD              Review of Systems:   A 12 point Review of Systems is negative other than noted in the HPI            Physical Exam:   Patient Vitals for the past 24 hrs:   BP Height Weight   02/25/25 1347 (!) 142/78 1.981 m (6' 6\") 124.7 kg (275 lb)        [unfilled]   Constitutional:   awake, alert, cooperative, no apparent distress, and appears stated age       Eyes:   PERRL, conjunctiva/corneas clear, EOM's intact; no scleral edema or icterus noted        ENT:   Normocephalic, without obvious abnormality, atraumatic, Lips, mucosa, and tongue normal        Hematologic / Lymphatic:   No lymphadenopathy       Lungs:   Normal respiratory effort, no accessory muscle use       Cardiovascular:   Regular rate and rhythm       Abdomen:   Soft, nondistended, nontender to palpation.  The incisional ventral hernia is reducible. "       Musculoskeletal:   No obvious swelling, bruising or deformity       Skin:   Skin color and texture normal for patient, no rashes or lesions              Data:         All imaging studies reviewed by me. I reviewed the images, and I agree with a large incisional ventral hernia of the mid abdomen containing small bowel.      DO David Jarrett DO  General Surgeon  [facility]  Surgery [address] [address]?  Office: [phone]  Employed by - [facility] [employer]  Pager: [phone]       I, Venu Dacosta, give verbal consent for a resident to be present in today's visit.     Constantino Mcintosh CMA  She/Her      [facility]  Surgery [facility]  [facility] - [address] [address]    Office: [phone]  Fax: [phone]         Again, thank you for allowing me to participate in the care of your patient.        Sincerely,        David Gaxiola DO    Electronically signed

## 2025-02-25 NOTE — LETTER
Pre-op Physical: Schedule a pre-op physical with your primary care doctor. The pre-op physical must be 10-30 days before surgery and since it is required by anesthesia, your surgery will be cancelled if it's not done.    Surgery Date: 7/11/2025     Location: [place] [address]    Approximate Arrival Time: 6:00 am  (Unless instructed differently by the pre-op call nurse)     Post op Appointment: 7/25/2025 at  2:00 pm with  Dr Gaxiola  [facility], [address] [place] [address] [address] [address].    Pre-Surgical Tasks:     Review all medications with your primary care or prescribing physician; they will advise you which meds to stop and when, and when you can resume taking.  Certain medications like blood thinners and weight loss medications need to be stopped in advance of surgery to proceed safely.      Blood thinners including but not exclusive to drugs like Xarelto, Eliquis, Warfarin and Aspirin, should be stopped five days before surgery, if your prescribing provider agrees. Follow your provider's advice on stopping blood thinners because they know you best.  If you are unsure if your medication is a blood thinner, ask your prescribing provider.    Weight loss medications: There are multiple medications being used for weight management and diabetes today, and the list is growing.  Phentermine, Ozempic, Wegovy, Trulicity, and other similar medications need to be stopped one week before surgery to avoid being cancelled.  Victoza and Saxenda can be continued longer but must be stopped one full day before surgery.  Please ask your prescribing provider for advice.    Diabetic medications: in addition to the medications talked about above that are used for either weight loss or diabetes, some people are on insulin that may require adjustment.  Please discuss managing diabetic medications with your prescribing doctor as  these medications may require modification prior to surgery.     Please shower the evening before and morning of surgery with Hibiclens soap.  Any [facility] can provide this to you at no cost, or it can be found at your local pharmacy.     Fasting instructions will be provided by the pre-op nurse who will call you 1-3 days before surgery.  Typically, we advise normal food up to 8 hours before you arrive for surgery. Clear liquids only from then until 2 hours before you arrive surgery, then nothing at all by mouth.  The nurse will review your specific instructions with you at the call.      Smoking impacts your body's ability to heal properly so we advise patients to quit if possible before surgery.  Plastic Surgery patients are required to be nicotine free for at least 8 weeks before surgery.      You will need an adult to drive you home and stay with you 24 hours after surgery. Public transportation or Medical Van Services are not permitted.    Visitor restrictions are subject to change, please verify with the pre-op nurse when they call how many people are permitted to accompany you.    We always encourage you to notify your insurance any time you have medical tests or procedures scheduled including surgery. The number is usually right on the back of your insurance card. To obtain pricing for surgery, please call [facility] Cost of Care at [phone] or email [email or web address].        Call our office if you have any questions! Thank you!     Fili Lala  Complex   [facility]  [phone]    Electronically signed

## 2025-03-28 PROBLEM — I71.010 DISSECTING ANEURYSM OF THORACIC AORTA, STANFORD TYPE A (H): Status: ACTIVE | Noted: 2025-03-28

## 2025-04-15 ENCOUNTER — TELEPHONE (OUTPATIENT)
Dept: FAMILY MEDICINE | Facility: CLINIC | Age: 40
End: 2025-04-15
Payer: COMMERCIAL

## 2025-04-15 NOTE — TELEPHONE ENCOUNTER
----- Message from Yara PADILLA sent at 4/15/2025  4:03 PM CDT -----  LEXIS Called x 4, numbers in epic not active, unable to reach out to pt. Thanks  ----- Message -----  From: Yara Gaxiola RMA  Sent: 4/10/2025  10:06 AM CDT  To: FLOR Aldridge    Called no answer  ----- Message -----  From: Yara Gaxiola RMA  Sent: 4/7/2025   8:57 AM CDT  To: FLOR Aldridge    Called, unable to get through phone line  ----- Message -----  From: Giovanna Montano MD  Sent: 4/4/2025   4:46 PM CDT  To: Karl Yellow Team    Hey Team,     This patient needs 40 min visits from now on. Please call him to reschedule in the next 1-2 weeks for high BP.      Best,   Dr. Montano

## 2025-07-01 ENCOUNTER — OFFICE VISIT (OUTPATIENT)
Dept: FAMILY MEDICINE | Facility: CLINIC | Age: 40
End: 2025-07-01
Payer: COMMERCIAL

## 2025-07-01 VITALS
TEMPERATURE: 97.2 F | BODY MASS INDEX: 32.28 KG/M2 | SYSTOLIC BLOOD PRESSURE: 149 MMHG | DIASTOLIC BLOOD PRESSURE: 88 MMHG | RESPIRATION RATE: 18 BRPM | HEART RATE: 86 BPM | HEIGHT: 78 IN | WEIGHT: 279 LBS | OXYGEN SATURATION: 100 %

## 2025-07-01 DIAGNOSIS — Z01.818 PREOP GENERAL PHYSICAL EXAM: Primary | ICD-10-CM

## 2025-07-01 DIAGNOSIS — Z86.79 HX OF REPAIR OF DISSECTING THORACIC AORTIC ANEURYSM, STANFORD TYPE A: ICD-10-CM

## 2025-07-01 DIAGNOSIS — I50.9 CONGESTIVE HEART FAILURE, UNSPECIFIED HF CHRONICITY, UNSPECIFIED HEART FAILURE TYPE (H): ICD-10-CM

## 2025-07-01 DIAGNOSIS — I10 ESSENTIAL HYPERTENSION: ICD-10-CM

## 2025-07-01 DIAGNOSIS — Z86.2 HISTORY OF ANEMIA: ICD-10-CM

## 2025-07-01 DIAGNOSIS — J30.2 SEASONAL ALLERGIC RHINITIS, UNSPECIFIED TRIGGER: ICD-10-CM

## 2025-07-01 DIAGNOSIS — K43.9 VENTRAL HERNIA WITHOUT OBSTRUCTION OR GANGRENE: ICD-10-CM

## 2025-07-01 DIAGNOSIS — Z98.890 HX OF REPAIR OF DISSECTING THORACIC AORTIC ANEURYSM, STANFORD TYPE A: ICD-10-CM

## 2025-07-01 DIAGNOSIS — Z13.6 SCREENING FOR CARDIOVASCULAR CONDITION: Primary | ICD-10-CM

## 2025-07-01 LAB
ERYTHROCYTE [DISTWIDTH] IN BLOOD BY AUTOMATED COUNT: 16.6 % (ref 10–15)
HCT VFR BLD AUTO: 41.3 % (ref 40–53)
HGB BLD-MCNC: 12.7 G/DL (ref 13.3–17.7)
MCH RBC QN AUTO: 25.2 PG (ref 26.5–33)
MCHC RBC AUTO-ENTMCNC: 30.8 G/DL (ref 31.5–36.5)
MCV RBC AUTO: 82 FL (ref 78–100)
PLATELET # BLD AUTO: 253 10E3/UL (ref 150–450)
RBC # BLD AUTO: 5.04 10E6/UL (ref 4.4–5.9)
WBC # BLD AUTO: 4.8 10E3/UL (ref 4–11)

## 2025-07-01 PROCEDURE — 36415 COLL VENOUS BLD VENIPUNCTURE: CPT

## 2025-07-01 PROCEDURE — 85027 COMPLETE CBC AUTOMATED: CPT

## 2025-07-01 PROCEDURE — 99214 OFFICE O/P EST MOD 30 MIN: CPT | Mod: GC

## 2025-07-01 RX ORDER — FLUTICASONE PROPIONATE 50 MCG
1 SPRAY, SUSPENSION (ML) NASAL DAILY
Qty: 15.8 G | Refills: 1 | Status: SHIPPED | OUTPATIENT
Start: 2025-07-01

## 2025-07-01 RX ORDER — LOSARTAN POTASSIUM 25 MG/1
25 TABLET ORAL DAILY
Qty: 90 TABLET | Refills: 0 | Status: SHIPPED | OUTPATIENT
Start: 2025-07-01

## 2025-07-01 NOTE — PROGRESS NOTES
Preoperative Evaluation  [facility] [address]  [place] [address]  Phone: [phone]  Fax: [phone]  Primary Provider: Pricila Espinal MD  Pre-op Performing Provider: Giovanna Montano MD  Jul 1, 2025   {Provider  Link to PREOP SmartSet  REQUIRED to apply standard patient instructions and medication directions to the AVS :720385}  {ROOMER review and update patient entered surgical information if needed :986190}        7/1/2025   Surgical Information   What procedure is being done? pre opp appt   Facility or Hospital where procedure/surgery will be performed: [facility]   Who is doing the procedure / surgery? dr daly   Date of surgery / procedure: 7 11 2025   Time of surgery / procedure: 6am   Where do you plan to recover after surgery? Other - ***     Fax number for surgical facility: {:877569}    {Provider Charting Preference for Preop :124355}    Dl Lea is a 40 year old, presenting for the following:  Pre-Op Exam and vaccines           7/1/2025     2:31 PM   Additional Questions   Roomed by neeraj   Accompanied by self         7/1/2025    Information    services provided? No     HPI: ***    Lives with a friend   Doesn't know exactly how he is taking meds   No chest pain, no sob with walking, can go upstairs with no issues         7/1/2025   Pre-Op Questionnaire   Have you ever had a heart attack or stroke? No   Have you ever had surgery on your heart or blood vessels, such as a stent placement, a coronary artery bypass, or surgery on an artery in your head, neck, heart, or legs? ***   Do you have chest pain with activity? no   Do you have a history of heart failure? No   Do you currently have a cold, bronchitis or symptoms of other infection? No   Do you have a cough, shortness of breath, or wheezing? No   Do you or anyone in your family have previous history of blood clots? No   Do you or does anyone in your family have a  serious bleeding problem such as prolonged bleeding following surgeries or cuts? No   Have you ever had problems with anemia or been told to take iron pills? No   Have you had any abnormal blood loss such as black, tarry or bloody stools? No   Have you ever had a blood transfusion? No   Are you willing to have a blood transfusion if it is medically needed before, during, or after your surgery? Yes   Have you or any of your relatives ever had problems with anesthesia? No   Do you have sleep apnea, excessive snoring or daytime drowsiness? No   Do you have any artifical heart valves or other implanted medical devices like a pacemaker, defibrillator, or continuous glucose monitor? No   Do you have artificial joints? No   Are you allergic to latex? No     Advance Care Planning  {The storyboard will display whether the patient has ACP docs on file. Hover over the Code section in the storyboard to access the ACP documents. :171862}  {(AWV REQUIRED) Advance Care Planning Reviewed:995753}    Preoperative Review of   {Mnpmpreport:867570}  {Review MNPMP for all patients per [employer] MNPMP Profile:524709}    {Chronic problem details (Optional) :819015}    Patient Active Problem List    Diagnosis Date Noted    Dissecting aneurysm of thoracic aorta, [place] type A (H) 03/28/2025     Priority: Medium    Essential hypertension 01/22/2025     Priority: Medium    Hx of repair of dissecting thoracic aortic aneurysm, [place] type A 01/22/2025     Priority: Medium    Chest wall pain 01/22/2025     Priority: Medium      No past medical history on file.  Past Surgical History:   Procedure Laterality Date    FACIAL FRACTURE SURGERY       Current Outpatient Medications   Medication Sig Dispense Refill    acetaminophen (TYLENOL) 500 MG tablet Take 1-2 tablets (500-1,000 mg) by mouth every 8 hours as needed for mild pain. 90 tablet 0    acetaminophen (TYLENOL) 500 MG tablet Take 1-2 tablets (500-1,000 mg) by mouth every 6 hours as needed for  "mild pain. 100 tablet 3    cyclobenzaprine (FLEXERIL) 5 MG tablet Take 2 tablets (10 mg) by mouth nightly as needed for muscle spasms. 30 tablet 0    diclofenac (VOLTAREN) 1 % topical gel Apply 4 g topically 4 times daily. 100 g 3    furosemide (LASIX) 20 MG tablet Take 1 tablet (20 mg) by mouth daily. 30 tablet 0    furosemide (LASIX) 20 MG tablet Take 20 mg by mouth daily.      ketoconazole (NIZORAL) 2 % external shampoo Apply 5 to 10 mL to wet scalp, lather, leave on 3 to 5 minutes, and rinse; apply twice weekly for 2 to 4 weeks 120 mL 5    losartan (COZAAR) 25 MG tablet Take 1 tablet (25 mg) by mouth daily. 90 tablet 0    metoprolol tartrate (LOPRESSOR) 50 MG tablet Take 2 tablets (100 mg) by mouth 2 times daily. 240 tablet 3       Allergies   Allergen Reactions    No Known Allergies         Social History     Tobacco Use    Smoking status: Never     Passive exposure: Never    Smokeless tobacco: Never   Substance Use Topics    Alcohol use: Not on file     {FAMILY HISTORY (Optional):209878426}  History   Drug Use Not on file           {ROS Picklists (Optional):910392}    Objective    BP (!) 149/88   Pulse 86   Temp 97.2  F (36.2  C) (Tympanic)   Resp 18   Ht 1.981 m (6' 6\")   Wt 126.6 kg (279 lb)   SpO2 100%   BMI 32.24 kg/m     Estimated body mass index is 32.24 kg/m  as calculated from the following:    Height as of this encounter: 1.981 m (6' 6\").    Weight as of this encounter: 126.6 kg (279 lb).  Physical Exam  {Exam List :619558}    Recent Labs   Lab Test 25  1151 25  1111   HGB  --  10.5*   PLT  --  453*    140   POTASSIUM 3.8 4.2   CR 0.92 1.01        Diagnostics  {LABS:156208}   {EK}    Revised Cardiac Risk Index (RCRI)  The patient has the following serious cardiovascular risks for perioperative complications:  {PREOP REVISED CARDIAC RISK INDEX (RCRI) :734933}     RCRI Interpretation: {REVISED CARDIAC RISK INTERPRETATION :878311}         Signed Electronically by: Giovanna " MD Dusty  A copy of this evaluation report is provided to the requesting physician.    {Provider Resources  Preop [facility] Preop Guidelines  Revised Cardiac Risk Index :219037}   {Email feedback regarding this note to [email or web address]   :123753}   "1.981 m (6' 6\")   Wt 126.6 kg (279 lb)   SpO2 100%   BMI 32.24 kg/m     Estimated body mass index is 32.24 kg/m  as calculated from the following:    Height as of this encounter: 1.981 m (6' 6\").    Weight as of this encounter: 126.6 kg (279 lb).  Physical Exam  GENERAL: alert and no distress  RESP: lungs clear to auscultation - no rales, rhonchi or wheezes  CV: regular rate and rhythm, normal S1 S2, no murmur, no peripheral edema  ABDOMEN: soft, nontender, veneral hernia reducible and bowel sounds normal  MS: no gross musculoskeletal defects noted, no edema  PSYCH: mentation appears normal, affect flat  Recent Labs   Lab Test 03/28/25  1151 01/09/25  1111   HGB  --  10.5*   PLT  --  453*    140   POTASSIUM 3.8 4.2   CR 0.92 1.01        Diagnostics  Labs pending at this time.  Results will be reviewed when available.   No EKG required, no history of coronary heart disease, significant arrhythmia, peripheral arterial disease or other structural heart disease.    Revised Cardiac Risk Index (RCRI)  The patient has the following serious cardiovascular risks for perioperative complications:   - No serious cardiac risks = 0 points     RCRI Interpretation: 0 points: Class I (very low risk - 0.4% complication rate)         Signed Electronically by: Giovanna Montano MD  A copy of this evaluation report is provided to the requesting physician.        "

## 2025-07-01 NOTE — PROGRESS NOTES
Preceptor Attestation:   Patient seen, evaluated and discussed with the resident. I have verified the content of the note, which accurately reflects my assessment of the patient and the plan of care.    Supervising Physician:Sasha Saab MD    [facility]

## 2025-07-03 ENCOUNTER — RESULTS FOLLOW-UP (OUTPATIENT)
Dept: FAMILY MEDICINE | Facility: CLINIC | Age: 40
End: 2025-07-03

## 2025-07-06 NOTE — PATIENT INSTRUCTIONS
How to Take Your Medication Before Surgery  Preoperative Medication Instructions   Antiplatelet or Anticoagulation Medication Instructions   - We reviewed the medication list and the patient is not on an antiplatelet or anticoagulation medications.    Additional Medication Instructions  Take all scheduled medications on the day of surgery       Patient Education   Preparing for Your Surgery  For Adults  Getting started  In most cases, a nurse will call to review your health history and instructions. They will give you an arrival time based on your scheduled surgery time. Please be ready to share:  Your doctor's clinic name and phone number  Your medical, surgical, and anesthesia history  A list of allergies and sensitivities  A list of medicines, including herbal treatments and over-the-counter drugs  Whether the patient has a legal guardian (ask how to send us the papers in advance)  Note: You may not receive a call if you were seen at our PAC ([facility]).  Please tell us if you're pregnant--or if there's any chance you might be pregnant. Some surgeries may injure a fetus (unborn baby), so they require a pregnancy test. Surgeries that are safe for a fetus don't always need a test, and you can choose whether to have one.   Preparing for surgery  Within 10 to 30 days of surgery: Have a pre-op exam (sometimes called an H&P, or History and Physical). This can be done at a clinic or pre-operative center.  If you're having a , you may not need this exam. Talk to your care team.  At your pre-op exam, talk to your care team about all medicines you take. (This includes CBD oil and any drugs, such as THC, marijuana, and other forms of cannabis.) If you need to stop any medicine before surgery, ask when to start taking it again.  This is for your safety. Many medicines and drugs can make you bleed too much during surgery. Some change how well surgery (anesthesia) drugs work.  Call your insurance  company to let them know you're having surgery. (If you don't have insurance, call [phone].)  Call your clinic if there's any change in your health. This includes a scrape or scratch near the surgery site, or any signs of a cold (sore throat, runny nose, cough, rash, fever).  Eating and drinking guidelines  For your safety: Unless your surgeon tells you otherwise, follow the guidelines below.  Eat and drink as normal until 8 hours before you arrive for surgery. After that, no food or milk. You can spit out gum when you arrive.  Drink clear liquids until 2 hours before you arrive. These are liquids you can see through, like water, Gatorade, and Propel Water. They also include plain black coffee and tea (no cream or milk).  No alcohol for 24 hours before you arrive. The night before surgery, stop any drinks that contain THC.  If your care team tells you to take medicine on the morning of surgery, it's okay to take it with a sip of water. No other medicines or drugs are allowed (including CBD oil)--follow your care team's instructions.  If you have questions the day of surgery, call your hospital or surgery center.   Preventing infection  Shower or bathe the night before and the morning of surgery. Follow the instructions your clinic gave you. (If no instructions, use regular soap.)  Don't shave or clip hair near your surgery site. We'll remove the hair if needed.  Don't smoke or vape the morning of surgery. No chewing tobacco for 6 hours before you arrive. A nicotine patch is okay. You may spit out nicotine gum when you arrive.  For some surgeries, the surgeon will tell you to fully quit smoking and nicotine.  We will make every effort to keep you safe from infection. We will:  Clean our hands often with soap and water (or an alcohol-based hand rub).  Clean the skin at your surgery site with a special soap that kills germs.  Give you a special gown to keep you warm. (Cold raises the risk of infection.)  Wear hair  covers, masks, gowns, and gloves during surgery.  Give antibiotic medicine, if prescribed. Not all surgeries need this medicine.  What to bring on the day of surgery  Photo ID and insurance card  Copy of your health care directive, if you have one  Glasses and hearing aids (bring cases)  You can't wear contacts during surgery  Inhaler and eye drops, if you use them (tell us about these when you arrive)  CPAP machine or breathing device, if you use them  A few personal items, if spending the night  If you have . . .  A pacemaker, ICD (cardiac defibrillator), or other implant: Bring the ID card.  An implanted stimulator: Bring the remote control.  A legal guardian: Bring a copy of the certified (court-stamped) guardianship papers.  Please remove any jewelry, including body piercings. Leave jewelry and other valuables at home.  If you're going home the day of surgery  You must have a support person drive you home. They should stay with you overnight, and they may need to help with your self-care.  If you don't have a support person, please tells us as soon as possible. We can help.  After surgery  If it's hard to control your pain or you need more pain medicine, please call your surgeon's office.  Questions?   If you have any questions for your care team, list them here:   ____________________________________________________________________________________________________________________________________________________________________________________________________________________________________________________________  For informational purposes only. Not to replace the advice of your health care provider. Copyright   2003, 2019 [facility]. All rights reserved. Clinically reviewed by Luke Day MD. [employer] [id number] - REV 02/25.     Patient Education   Preparing for Your Surgery  For Adults  Getting started  In most cases, a nurse will call to review your health history and instructions. They  will give you an arrival time based on your scheduled surgery time. Please be ready to share:  Your doctor's clinic name and phone number  Your medical, surgical, and anesthesia history  A list of allergies and sensitivities  A list of medicines, including herbal treatments and over-the-counter drugs  Whether the patient has a legal guardian (ask how to send us the papers in advance)  Note: You may not receive a call if you were seen at our PAC ([facility]).  Please tell us if you're pregnant--or if there's any chance you might be pregnant. Some surgeries may injure a fetus (unborn baby), so they require a pregnancy test. Surgeries that are safe for a fetus don't always need a test, and you can choose whether to have one.   Preparing for surgery  Within 10 to 30 days of surgery: Have a pre-op exam (sometimes called an H&P, or History and Physical). This can be done at a clinic or pre-operative center.  If you're having a , you may not need this exam. Talk to your care team.  At your pre-op exam, talk to your care team about all medicines you take. (This includes CBD oil and any drugs, such as THC, marijuana, and other forms of cannabis.) If you need to stop any medicine before surgery, ask when to start taking it again.  This is for your safety. Many medicines and drugs can make you bleed too much during surgery. Some change how well surgery (anesthesia) drugs work.  Call your insurance company to let them know you're having surgery. (If you don't have insurance, call [phone].)  Call your clinic if there's any change in your health. This includes a scrape or scratch near the surgery site, or any signs of a cold (sore throat, runny nose, cough, rash, fever).  Eating and drinking guidelines  For your safety: Unless your surgeon tells you otherwise, follow the guidelines below.  Eat and drink as normal until 8 hours before you arrive for surgery. After that, no food or milk. You can  spit out gum when you arrive.  Drink clear liquids until 2 hours before you arrive. These are liquids you can see through, like water, Gatorade, and Propel Water. They also include plain black coffee and tea (no cream or milk).  No alcohol for 24 hours before you arrive. The night before surgery, stop any drinks that contain THC.  If your care team tells you to take medicine on the morning of surgery, it's okay to take it with a sip of water. No other medicines or drugs are allowed (including CBD oil)--follow your care team's instructions.  If you have questions the day of surgery, call your hospital or surgery center.   Preventing infection  Shower or bathe the night before and the morning of surgery. Follow the instructions your clinic gave you. (If no instructions, use regular soap.)  Don't shave or clip hair near your surgery site. We'll remove the hair if needed.  Don't smoke or vape the morning of surgery. No chewing tobacco for 6 hours before you arrive. A nicotine patch is okay. You may spit out nicotine gum when you arrive.  For some surgeries, the surgeon will tell you to fully quit smoking and nicotine.  We will make every effort to keep you safe from infection. We will:  Clean our hands often with soap and water (or an alcohol-based hand rub).  Clean the skin at your surgery site with a special soap that kills germs.  Give you a special gown to keep you warm. (Cold raises the risk of infection.)  Wear hair covers, masks, gowns, and gloves during surgery.  Give antibiotic medicine, if prescribed. Not all surgeries need this medicine.  What to bring on the day of surgery  Photo ID and insurance card  Copy of your health care directive, if you have one  Glasses and hearing aids (bring cases)  You can't wear contacts during surgery  Inhaler and eye drops, if you use them (tell us about these when you arrive)  CPAP machine or breathing device, if you use them  A few personal items, if spending the night  If  you have . . .  A pacemaker, ICD (cardiac defibrillator), or other implant: Bring the ID card.  An implanted stimulator: Bring the remote control.  A legal guardian: Bring a copy of the certified (court-stamped) guardianship papers.  Please remove any jewelry, including body piercings. Leave jewelry and other valuables at home.  If you're going home the day of surgery  You must have a support person drive you home. They should stay with you overnight, and they may need to help with your self-care.  If you don't have a support person, please tells us as soon as possible. We can help.  After surgery  If it's hard to control your pain or you need more pain medicine, please call your surgeon's office.  Questions?   If you have any questions for your care team, list them here:   ____________________________________________________________________________________________________________________________________________________________________________________________________________________________________________________________  For informational purposes only. Not to replace the advice of your health care provider. Copyright   2003, 2019 [facility]. All rights reserved. Clinically reviewed by Luke Day MD. [employer] [id number] - REV 02/25.